# Patient Record
(demographics unavailable — no encounter records)

---

## 2025-05-08 NOTE — PHYSICAL EXAM
[Sclera] : the sclera and conjunctiva were normal [PERRL With Normal Accommodation] : pupils were equal in size, round, and reactive to light [Neck Appearance] : the appearance of the neck was normal [Respiration, Rhythm And Depth] : normal respiratory rhythm and effort [Exaggerated Use Of Accessory Muscles For Inspiration] : no accessory muscle use [Apical Impulse] : the apical impulse was normal [Heart Rate And Rhythm] : heart rate was normal and rhythm regular [Heart Sounds] : normal S1 and S2 [Heart Sounds Gallop] : no gallops [Murmurs] : no murmurs [Heart Sounds Pericardial Friction Rub] : no pericardial rub [Examination Of The Chest] : the chest was normal in appearance [Chest Visual Inspection Thoracic Asymmetry] : no chest asymmetry [Diminished Respiratory Excursion] : normal chest expansion [2+] : left 2+ [Breast Appearance] : normal in appearance [Bowel Sounds] : normal bowel sounds [Abdomen Soft] : soft [Abdomen Tenderness] : non-tender [Abnormal Walk] : normal gait [Nail Clubbing] : no clubbing  or cyanosis of the fingernails [Involuntary Movements] : no involuntary movements were seen [Skin Color & Pigmentation] : normal skin color and pigmentation [Skin Turgor] : normal skin turgor [] : no rash [FreeTextEntry1] : CT sites clean, dry, and intact. Left SVG clean, dry, and intact. Trace LE edema.  [No Focal Deficits] : no focal deficits [Oriented To Time, Place, And Person] : oriented to person, place, and time [Impaired Insight] : insight and judgment were intact [Affect] : the affect was normal [Mood] : the mood was normal [Memory Recent] : recent memory was not impaired [Memory Remote] : remote memory was not impaired

## 2025-05-08 NOTE — PLAN
[TextEntry] : Post Operative Care:  Pt advised of importance of daily weights. Pt advised to call [facility] NP for weight gain of 3 lbs or more. Pt instructed on how to use incentive spirometer every hour, demonstrated proper use.  Pt encouraged to ambulate as much as tolerated, avoiding extreme temperatures outdoors.  Also advised to cleanse incisions daily with mild soap and water and to avoid lotions, powders, ointments or creams near or on the incision.  Low salt, low fat diet encouraged and discussed.  Pt advised to avoid heavy lifting or straining. Pt advised no driving until cleared by Dr. Ruiz.     [facility] team will continue to follow up with pt status.  NP/CCC roles explained with pt understanding, contact information provided. Pt agrees to call with any questions, issues or concerns.  Worsening symptoms reviewed with patient understanding.      FOLLOW UP APPOINTMENTS:  CTS: Dr. Ruiz appointment 5/13/25  CARDIOLOGIST: Dr. Scott appointment 5/12/25  Dr. Spencer 2 weeks  PCP: Pt encouraged to follow up within one month of discharge

## 2025-05-08 NOTE — ASSESSMENT
[FreeTextEntry1] : S/p CABG x2- continue Asa, Atorvastatin, Clopidogrel, and Metoprolol Tartrate.  Pafib- continue Amiodarone, Asa, and Metoprolol Tartrate. DM- continue Glimepiride, Jardiance, and Trulicity with consistent carb diet.

## 2025-05-08 NOTE — REASON FOR VISIT
[Follow-Up: _____] : a [unfilled] follow-up visit [Spouse] : spouse [Family Member] : family member [FreeTextEntry2] : 5/2/25

## 2025-05-08 NOTE — HISTORY OF PRESENT ILLNESS
[Diabetes Mellitus] : Diabetes Mellitus [Diet] : controlled with diet [Oral] : controlled with oral diabetes medication [Dyslipidemia] : Dyslipidemia [Hypertension] : Hypertension [A fib / A flutter] : A fib / A flutter [Paroxysmal] : Paroxysmal [FreeTextEntry1] : 60 YO Male w/ PMHx of HLD, bicuspid aortic valve with moderate AI and ascending aortic aneurysm who presented on 4/30/25 for elective cardiac cath prior to surgery. Cardiac cath showed nonobstructive CAD. On 5/1/25, he underwent a AVR with 27mm bioprosthetic valve and ascending aorta and hemiarch replacement with Dr. Conner. Intraoperative patient had bicuspid aortic valve with fusion of right and left cusps. He was transferred to the CTICU intubated on milrinone. He was extubated on POD1. Aspirin was started. On POD2, milrinone was weaned off. He was diuresed. On POD3, blakes were removed and he was transferred to the step-down unit. On POD4, wires and remaining mercy removed. BB started. POD5 Patient feeling well, cleared for discharge home per Dr. Gallegos. Mr. Cortes is recovering at home with his wife and daughter. He is ambulating independently.  Pt denies dizziness, SOB, incisional pain, abdominal pain, and LE swelling.  Pt verbalized his appetite is improving and incisional pain alleviated with Acetaminophen prn.  [facility] initiated care 5/7/25.

## 2025-05-14 NOTE — REASON FOR VISIT
[Spouse] : spouse [de-identified] : OPCAB x 2 [de-identified] : 5/2/25 [de-identified] : Intraop uncomplicated. Extubated in OR. POD 3 in [facility]ANN, converted to SR w/amio and BB. Patient discharged home on 5 /6.  Patient presents for post op visit. He appears generally well, denies c/o chest pain, sob/tolentino, fever, lower extremity edema or palpitations. Chest xray today wnl, no effusion

## 2025-05-14 NOTE — END OF VISIT
[FreeTextEntry3] : I, Dr. GARRETT [facility], personally performed the evaluation and management (E/M) services for this established patient who presents today with (a) new problem(s)/exacerbation of (an) existing condition(s).  That E/M includes conducting the clinically appropriate interval history &/or exam, assessing all new/exacerbated conditions, and establishing a new plan of care.  Today, my KOBE, was here to observe &/or participate in the visit & follow plan of care established by me.

## 2025-05-14 NOTE — REASON FOR VISIT
[Spouse] : spouse [de-identified] : OPCAB x 2 [de-identified] : 5/2/25 [de-identified] : Intraop uncomplicated. Extubated in OR. POD 3 in [facility]ANN, converted to SR w/amio and BB. Patient discharged home on 5 /6.  Patient presents for post op visit. He appears generally well, denies c/o chest pain, sob/tolentino, fever, lower extremity edema or palpitations. Chest xray today wnl, no effusion

## 2025-05-14 NOTE — PHYSICAL EXAM
[] : no respiratory distress [Auscultation Breath Sounds / Voice Sounds] : lungs were clear to auscultation bilaterally [Heart Rate And Rhythm] : heart rate was normal and rhythm regular [Heart Sounds] : normal S1 and S2 [Heart Sounds Gallop] : no gallops [Murmurs] : no murmurs [Heart Sounds Pericardial Friction Rub] : no pericardial rub [Clean] : clean [Dry] : dry [Healing Well] : healing well [___ +] : left pretibial [unfilled]U+ pretibial pitting edema [FreeTextEntry1] : sternum stable [FreeTextEntry5] : left leg EVH incision

## 2025-05-14 NOTE — END OF VISIT
[FreeTextEntry3] : I, Dr. GARRETT [facility], personally performed the evaluation and management (E/M) services for this established patient who presents today with (a) new problem(s)/exacerbation of (an) existing condition(s).  That E/M includes conducting the clinically appropriate interval history &/or exam, assessing all new/exacerbated conditions, and establishing a new plan of care.  Today, my KOBE, was here to observe &/or participate in the visit & follow plan of care established by me.

## 2025-05-14 NOTE — DISCUSSION/SUMMARY
[Doing Well] : is doing well [1] : 1 [FreeTextEntry1] : Plan:  Continue current medication regimen. Continue to increase activity and walk daily as tolerated. Continue to use incentive spirometer.  No driving or strenuous activity for 4 weeks after surgery. Avoid lifting >10 to 15 lbs. Call MD if you experience fever, fatigue, dizziness, confusion, syncope, shortness of breath, chest pain not relieved with analgesics, increased redness/drainage from incision. Follow up with Dr. Scott stop plavix in 1 month follow up with Dr. Tj Slaughter from CTS service

## 2025-05-29 NOTE — DATA REVIEWED
[FreeTextEntry1] : 4/11/2025 PET/CT as follows: Impression: Hypermetobolic left lower lobe lesion most consistent with lung cancer.  Subcentimeter subsolid medial right lower lobe nodularity with mild hypermetabolic uptake. Appearance nonspecific. Recommend attention on interval follow-up chest CT.  Punctate nonspecific uptake inferior prostate. Suggest correlation with PSA. If warranted this could be further evaluated prostate MRI.   Focal soft tissue uptake anterior to the left shoulder of uncertain etiology. Correlate clinically. If warranted this could be further evaluated Left shoulder MRI.
